# Patient Record
Sex: FEMALE | Race: WHITE | Employment: OTHER | URBAN - NONMETROPOLITAN AREA
[De-identification: names, ages, dates, MRNs, and addresses within clinical notes are randomized per-mention and may not be internally consistent; named-entity substitution may affect disease eponyms.]

---

## 2021-01-26 ENCOUNTER — OUTSIDE SERVICES (OUTPATIENT)
Dept: FAMILY MEDICINE CLINIC | Age: 76
End: 2021-01-26
Payer: MEDICARE

## 2021-01-26 DIAGNOSIS — I82.509 CHRONIC DEEP VEIN THROMBOSIS (DVT) OF LOWER EXTREMITY, UNSPECIFIED LATERALITY, UNSPECIFIED VEIN (HCC): ICD-10-CM

## 2021-01-26 DIAGNOSIS — N39.46 MIXED STRESS AND URGE URINARY INCONTINENCE: ICD-10-CM

## 2021-01-26 DIAGNOSIS — N32.81 OAB (OVERACTIVE BLADDER): ICD-10-CM

## 2021-01-26 DIAGNOSIS — Z98.890 H/O LUMBAR DISCECTOMY: ICD-10-CM

## 2021-01-26 DIAGNOSIS — E05.90 HYPERTHYROIDISM: ICD-10-CM

## 2021-01-26 DIAGNOSIS — I87.2 CHRONIC VENOUS INSUFFICIENCY: ICD-10-CM

## 2021-01-26 DIAGNOSIS — F03.90 DEMENTIA WITHOUT BEHAVIORAL DISTURBANCE, UNSPECIFIED DEMENTIA TYPE: ICD-10-CM

## 2021-01-26 DIAGNOSIS — M54.50 LUMBAR PAIN: ICD-10-CM

## 2021-01-26 DIAGNOSIS — I89.0 LYMPHEDEMA OF BOTH LOWER EXTREMITIES: Primary | ICD-10-CM

## 2021-01-26 DIAGNOSIS — G57.93 NEUROPATHY INVOLVING BOTH LOWER EXTREMITIES: ICD-10-CM

## 2021-01-26 DIAGNOSIS — Z98.890 HISTORY OF HEMILAMINECTOMY: ICD-10-CM

## 2021-01-26 DIAGNOSIS — D64.9 NORMOCHROMIC NORMOCYTIC ANEMIA: ICD-10-CM

## 2021-01-26 DIAGNOSIS — M81.0 AGE-RELATED OSTEOPOROSIS WITHOUT CURRENT PATHOLOGICAL FRACTURE: ICD-10-CM

## 2021-01-26 NOTE — PROGRESS NOTES
1/26/2021    Hank Brink  1945    This resident is being seen today for 2 months evaluation visit. She is a resident who has long-term medical conditions including lymphedema to lower legs, chronic back pain with degenerative disc disease and spinal stenosis with neurogenic claudication neuropathy pain, status post hemilaminectomy partial fasciotomy and foraminotomy of L3-L5 with discectomy, chronic venous insufficiency, DVT right lower extremity, chronic osteoarthritis and osteoporosis, hyper thyroidism, dementia, overactive bladder, urinary incontinence, normochromic normocytic anemia and debilitation. Patient reports that she stopped taking most of her medications including her Xarelto for the DVTs patient tends to be noncompliant with her medications as well as her diet. .  She is a 76 y.o. female resident who is being seen today for chronic conditions. .  Currently at this time she denies any complaints of chest pain or palpitations. Denies any headaches, any sore throat, coughing, or shortness of breath. No nausea, vomiting, constipation or diarrhea. No pain in lower extremities. No fever, or chills. No recent falls or syncopal events.     Objective     Vital Signs: Blood pressure 128/67 pulse 85 respirations 22 temp 97.3 saturation 91% and weight is 236.4 pounds Physical examination:Skin is essentially warm and dry. HEENT unremarkable. Neck is supple. Heart regular rate and rhythm. No rubs, gallops or murmurs noted. Lungs are clear to auscultation. No evidence of rhonchi, rales, or wheezing. Abdomen is soft, supple and non-tender. Bowel sounds are noted x4 quadrants. No rigidity, guarding or rebound tenderness. Negative Ruiz's, negative McBurney's, negative Dion's. Extremities; no true pitting edema. Pulses are adequate. No clubbing  or no cyanosis noted. Neurologically she  is alert and oriented x3. No evidence of paralysis or paresthesias noted. Self propels in wheelchair for mobility. Diagnoses and all orders for this visit:    Lymphedema of both lower extremities    Lumbar pain    Neuropathy involving both lower extremities    History of hemilaminectomy    H/O lumbar discectomy    Chronic venous insufficiency    Age-related osteoporosis without current pathological fracture    Chronic deep vein thrombosis (DVT) of lower extremity, unspecified laterality, unspecified vein (HCC)    Hyperthyroidism    Dementia without behavioral disturbance, unspecified dementia type (HCC)    OAB (overactive bladder)    Mixed stress and urge urinary incontinence    Normochromic normocytic anemia    Impaired fasting glucose    Patient's medications and labs are reviewed she will be due for blood work in the morning including CBC CMP lipids TSH and hemoglobin A1c. We will see her back in the office in 3 months further recommendations forthcoming thanks      [address] [place] [address]        *Note was creating using voice recognition software.   The document was reviewed however grammatical errors may exist.

## 2021-02-11 ENCOUNTER — OUTSIDE SERVICES (OUTPATIENT)
Dept: FAMILY MEDICINE CLINIC | Age: 76
End: 2021-02-11
Payer: MEDICARE

## 2021-02-11 DIAGNOSIS — M81.0 AGE-RELATED OSTEOPOROSIS WITHOUT CURRENT PATHOLOGICAL FRACTURE: ICD-10-CM

## 2021-02-11 DIAGNOSIS — E05.90 HYPERTHYROIDISM: ICD-10-CM

## 2021-02-11 DIAGNOSIS — I89.0 LYMPHEDEMA OF BOTH LOWER EXTREMITIES: Primary | ICD-10-CM

## 2021-02-11 DIAGNOSIS — N32.81 OAB (OVERACTIVE BLADDER): ICD-10-CM

## 2021-02-11 DIAGNOSIS — M54.50 LUMBAR PAIN: ICD-10-CM

## 2021-02-11 NOTE — PROGRESS NOTES
2/11/2021    Emi Safe  1945    This resident is being seen today for an acute evaluation visit. She is a resident who has long-term medical conditions including osteoporosis with underlying osteoarthritis, hypothyroidism, memory loss, lumbar compression fracture, chronic anemia, anxiety disorder, urinary incontinence, dementia, depression, spinal stenosis to L3-L4-L4-L5 degenerative in nature, scoliosis with degenerative facet disease with foraminal stenosis of L3-L4-L4-L5 bilaterally, iron deficiency anemia, DVT to the right lower extremity, obesity, GERD, memory impairment associated with presenile dementia, overactive bladder, neuropathy, and unsteady gait. Based on medications, hyper thyroidism. Surgical history includes left knee replacement on 10/30/2014, a revision to the right hip with history of total hip arthroplasty on 2/10/2015, hemilaminectomy with partial facetectomy, bilateral posterior microdecompression, L3-5 microdiscectomy on 11/30/2018. She is a 76 y.o. female resident who is being seen today for lab abnormalities. This is with respect to a low TSH of 0.01. It is of note to mention that this resident has been on methimazole for a period of time but indicates that she has not been taking this for quite a while. She is actually very nonadherent with all of her medications and we did go through them on today's visit. She currently has no associated symptoms with respect to hyperthyroidism in terms of what could be described as nervousness or irritability, sleep issues, heat sensitivity, or muscle weakness. Her only concern was with respect to her urinary frequency with which she did agree to resume her Ditropan to help control these  issues. She furthermore feels that her pain is essentially managed and indicates that she has been taking Neurontin for period of time, although not as directed.   Resident states she takes her Neurontin 1-2 times a day as needed, despite being ordered for 3 times a day routinely. She otherwise denies any current complaints in terms of headaches or dizziness, sore throat, coughing or shortness of breath, chest pain, nausea or vomiting, dysuria, constipation or diarrhea, fever or chills, recent falls or syncopal events. Medications: Donepezil 10 mg daily, Fosamax 70 mcg weekly, Celexa 20 mg daily, gabapentin 600 mg 3 times a day, Ditropan XL 10 mg daily, Zaroxolyn 2.5 mg on Monday Wednesday Friday and Saturday    Objective     Vital Signs: /84 pulse 83 respirations 24 temperature 96.7 oxygen saturation 95% weight 227.4 pounds    Physical examination:Skin is essentially warm and dry. HEENT unremarkable. Neck is supple. Heart regular rate and rhythm. No rubs, gallops or murmurs noted. Lungs are clear to auscultation. No evidence of rhonchi, rales, or wheezing. Abdomen is soft, supple and non-tender. Bowel sounds are noted x4 quadrants. No rigidity, guarding or rebound tenderness. Negative Ruiz's, negative McBurney's, negative Dion's. Extremities; residual edema noted with evidence of underlying lymphedema pulses are adequate. No clubbing  or no cyanosis noted. Neurologically she  is alert and oriented and fairly oriented. No evidence of paralysis or paresthesias noted. Self propels with respect to a wheelchair    Diagnoses and all orders for this visit:    Lymphedema of both lower extremities  Comments:  Resume a Zaroxolyn as previously indicated. Hyperthyroidism  Comments:  Hold methimazole as resident has been nonadherent. Repeat TSH in 6 weeks. Age-related osteoporosis without current pathological fracture  Comments:  Resume Fosamax weekly. Resume multivitamin along with calcium with vitamin D. Lumbar pain  Comments:  Maintain Neurontin. Stop baclofen and fentanyl.    OAB (overactive bladder)  Comments:  Maintain Ditropan. Plan: Plan of care was discussed with the healthcare team with meds and labs reviewed.   Lipid panel within normal limits BUN/creatinine 15/0.48 GFR 96 A1c 5.7 H/H 12.7/35.7 TSH 0.01. I did have a long visit with this resident as we did discuss all of her medications. She does remain nonadherent to almost all of them and indicated when she came in that she had only been taken the Neurontin for a period of time. After a long discussion she is agreeable to resuming the Fosamax every week, resuming the oxybutynin of 10 mg daily, resuming the Celexa of 20 mg daily, resuming the Zaroxolyn of 2.5 mg every Monday Wednesday Friday Saturday, and we will withhold the methimazole at this time and repeat a TSH in the course of 6 weeks. Furthermore, we will stop the fentanyl along with the baclofen, as she states that they are no longer needed. I will however maintain the benefit of the gabapentin 3 times a day, although she indicates she takes this 1-2 times a day as she feels it is needed. I do plan to follow-up with her in the course of the next 2 months, after repeat TSH is obtained. Prescription was given for donepezil on today's evaluation. I will furthermore maintain her plan of care as clinically indicated and I will track her intakes, monitor her weights and behaviors, and see her routinely and as needed with further orders forthcoming. CHUCK OLVERA, APRN - CNP      *Note was creating using voice recognition software.   The document was reviewed however grammatical errors may exist.

## 2021-04-08 ENCOUNTER — OUTSIDE SERVICES (OUTPATIENT)
Dept: FAMILY MEDICINE CLINIC | Age: 76
End: 2021-04-08
Payer: MEDICARE

## 2021-04-08 DIAGNOSIS — M81.0 AGE-RELATED OSTEOPOROSIS WITHOUT CURRENT PATHOLOGICAL FRACTURE: ICD-10-CM

## 2021-04-08 DIAGNOSIS — N32.81 OAB (OVERACTIVE BLADDER): ICD-10-CM

## 2021-04-08 DIAGNOSIS — Z91.199 CURRENT NONADHERENCE TO MEDICAL TREATMENT: Primary | ICD-10-CM

## 2021-04-08 DIAGNOSIS — R60.9 PERIPHERAL EDEMA: ICD-10-CM

## 2021-04-08 DIAGNOSIS — E05.90 HYPERTHYROIDISM: ICD-10-CM

## 2021-04-09 NOTE — PROGRESS NOTES
4/8/2021    Sophie Hall  1945    This resident is being seen today for a follow-up evaluation visit. She is a resident who has long-term medical conditions including osteoporosis with underlying osteoarthritis, hypothyroidism, memory loss, lumbar compression fracture, chronic anemia, anxiety disorder, urinary incontinence, dementia, depression, spinal stenosis to L3-L4-L4-L5 degenerative in nature, scoliosis with degenerative facet disease with foraminal stenosis of L3-L4-L4-L5 bilaterally, iron deficiency anemia, DVT to the right lower extremity, obesity, GERD, memory impairment associated with presenile dementia, overactive bladder, neuropathy, and unsteady gait. Based on medications, hyper thyroidism. Surgical history includes left knee replacement on 10/30/2014, a revision to the right hip with history of total hip arthroplasty on 2/10/2015, hemilaminectomy with partial facetectomy, bilateral posterior microdecompression, L3-5 microdiscectomy on 11/30/2018. She is a 76 y.o. female resident who is being seen today for follow-up evaluation visit with respect to medical management. This is a resident who is nonadherent to medications and did agree upon last visit to take certain medications. She does however bring to my attention that she only takes the gabapentin for pain management and that she only takes it once or twice a day as opposed to 3 times a day. She states that other medications are not taken because she just does not remember. She states that she feels fine and also indicates that she spoke with Dr. Massiel Fritz over the phone and he indicated that all is good and that no medication changes were needed. It is of note to mention that her methimazole has been on hold since 2/11/2021 due to the fact that she has been nonadherent with it for several months. She did have a recent TSH on 3/25/2021 which was low at 0.01.   Resident denies any complaints of headaches or dizziness, chest pain, coughing or shortness of breath, nausea or vomiting, constipation or diarrhea, dysuria or frequency, fever or chills, recent falls or syncopal events. It is of note to mention that she does still continue to complain of urinary urgency/frequency, but again has been noncompliant with her Ditropan. .        Medications:   Donepezil 10 mg daily  Fosamax 70 mcg weekly  Celexa 20 mg daily  gabapentin 600 mg 3 times a day  Ditropan XL 10 mg daily  Zaroxolyn 2.5 mg on M-W-F- Sat  Docusate calcium 240 mg at bedtime  Ferrous sulfate 325 mg daily      Objective     Vital Signs: /85 pulse 104 respirations 24 temperature 96.4 oxygen saturation 93% weight 220.8 pounds    Physical examination:Skin is essentially warm and dry. HEENT unremarkable. Neck is supple. Heart regular rate is rather tachycardic at this time. No rubs, gallops or murmurs noted. Lungs are clear to auscultation. No evidence of rhonchi, rales, or wheezing. Abdomen is soft, supple and non-tender. Bowel sounds are noted x4 quadrants. No rigidity, guarding or rebound tenderness. Negative Ruiz's, negative McBurney's, negative Dion's. Extremities; residual edema noted with evidence of underlying lymphedema pulses are adequate. No clubbing  or no cyanosis noted. Neurologically she  is alert and oriented and fairly oriented. No evidence of paralysis or paresthesias noted. Self propels with respect to a wheelchair    Diagnoses and all orders for this visit:    Current nonadherence to medical treatment  Comments:  Medications with dosages and times recommended written down    Hyperthyroidism  Comments:   Follows with Dr. Patrizia Keating methimazole currently on hold due to nonadherence with a TSH of 0.01    Peripheral edema  Comments:  Zaroxolyn 2.5 mg M-W-Fsat    OAB (overactive bladder)  Comments:  Maintain Ditropan XL 10 mg daily    Age-related osteoporosis without current pathological fracture  Comments:  Maintain Fosamax weekly      Plan: Plan of care was discussed with the healthcare team with meds and labs reviewed. TSH of 0.01, with results faxed to Dr. Kirby Ramos office. Resident does state that she spoke with the endocrinologist and he stated that there were no new orders. I did explain that we would like to get a follow-up TSH in the course of the next 3 months. Furthermore, I did write down all of the medications for this resident in addition to the dosages and how often she should be taking them. She did go over them individually and wrote what they were for. She does indicate that she will attempt to take them more religiously, especially her Ditropan and Zaroxolyn. I did ask that she follow-up with me in the course of the next 3 months and prior to her exam I will recommend a CBC, CMP, iron studies, TSH, T4.  I will furthermore maintain her current medical regimen as stated with her plan of care and I will track her intakes, monitor her weights and behaviors, and see her routinely and as needed with further orders forthcoming. CHUCK OLVERA, APRN - CNP      *Note was creating using voice recognition software.   The document was reviewed however grammatical errors may exist.

## 2021-07-08 ENCOUNTER — OUTSIDE SERVICES (OUTPATIENT)
Dept: FAMILY MEDICINE CLINIC | Age: 76
End: 2021-07-08

## 2021-07-08 DIAGNOSIS — Z91.199 CURRENT NONADHERENCE TO MEDICAL TREATMENT: ICD-10-CM

## 2021-07-08 DIAGNOSIS — M54.50 LUMBAR PAIN: ICD-10-CM

## 2021-07-08 DIAGNOSIS — G57.93 NEUROPATHY INVOLVING BOTH LOWER EXTREMITIES: Primary | ICD-10-CM

## 2021-07-08 DIAGNOSIS — R60.9 PERIPHERAL EDEMA: ICD-10-CM

## 2021-07-08 DIAGNOSIS — E05.90 HYPERTHYROIDISM: ICD-10-CM

## 2021-07-08 NOTE — PROGRESS NOTES
7/8/2021    Luz Maria Demarco  1945    This resident is being seen today for a follow-up evaluation visit. She is a resident who has long-term medical conditions including osteoporosis with underlying osteoarthritis, hypothyroidism, memory loss, lumbar compression fracture, chronic anemia, anxiety disorder, urinary incontinence, dementia, depression, spinal stenosis to L3-L4-L4-L5 degenerative in nature, scoliosis with degenerative facet disease with foraminal stenosis of L3-L4-L4-L5 bilaterally, iron deficiency anemia, DVT to the right lower extremity, obesity, GERD, memory impairment associated with presenile dementia, overactive bladder, neuropathy, and unsteady gait. Based on medications, hyper thyroidism. Surgical history includes left knee replacement on 10/30/2014, a revision to the right hip with history of total hip arthroplasty on 2/10/2015, hemilaminectomy with partial facetectomy, bilateral posterior microdecompression, L3-5 microdiscectomy on 11/30/2018. She is a 68 y.o. female resident who is being seen today for a 3-month follow-up evaluation. She indicates that she has constant pain to her legs and feet and does furthermore utilize her gabapentin, although not on a routine basis. This is a resident who has a history of medication noncompliance and has recommendations to take Neurontin 3 times daily routinely. Resident indicates that she takes it 1-2 times a day and maybe 3 depending on the activity level. She furthermore has been under assessment for some degree of edema to the bilateral lower extremities and admits to noncompliance with respect to her diuretic management. She otherwise states that she has had no headaches or dizziness, sore throat, chest pain, coughing or shortness of breath, nausea or vomiting, constipation or diarrhea, dysuria or frequency, fever or chills, falls or syncopal events.             Medications:   Donepezil 10 mg daily  Fosamax 70 mcg weekly  Celexa 20 mg daily  gabapentin 600 mg 3 times a day  Ditropan XL 10 mg daily  Zaroxolyn 2.5 mg on M-W-F- Sat  Docusate calcium 240 mg at bedtime  Ferrous sulfate 325 mg daily      Objective     Vital Signs: /77 pulse 100       respirations 24 temperature 97.1 oxygen saturation 93% weight 214.5 pounds    Physical examination:Skin is essentially warm and dry. HEENT unremarkable. Neck is supple. Heart regular rate is rather tachycardic at this time. No rubs, gallops or murmurs noted. Lungs are clear to auscultation. No evidence of rhonchi, rales, or wheezing. Abdomen is soft, supple and non-tender. Bowel sounds are noted x4 quadrants. No rigidity, guarding or rebound tenderness. Negative Ruiz's, negative McBurney's, negative Dion's. Extremities; residual edema noted with evidence of underlying lymphedema pulses are adequate. No clubbing  or no cyanosis noted. Neurologically she  is alert and oriented and fairly oriented. No evidence of paralysis or paresthesias noted. Self propels with respect to a wheelchair    Diagnoses and all orders for this visit:    Neuropathy involving both lower extremities  Comments:  Maintain the benefit of Neurontin with recommendations to take on a more routine basis    Current nonadherence to medical treatment  Comments:  Medication list copied and explained to resident    Hyperthyroidism  Comments:  Resident to withhold methimazole until follow-up with endocrinology    Lumbar pain  Comments:  Maintain gabapentin    Peripheral edema  Comments:  Maintain Zaroxolyn with resident encouraged to utilize medication      Plan: Plan of care was discussed with the healthcare team with meds and labs reviewed. Resident had labs completed 7/1/2021 with a total iron of 44 iron-binding capacity of 197 ferritin of 152 BUN/creatinine 14/0.47 GFR of 96 TSH 0.01 H/H 13.2/38. 6.   I did go over the medications with this resident at length and made her a copy of the medication list.  I then proceeded to write down why she was taking each of her medications. I did explain to her that she can continue to withhold the methimazole, as had previously been recommended by her endocrinologist.  Resident admits that she has been nonadherent to her medical regimen and that normally only takes her gabapentin. I did explain to her that the Zaroxolyn is beneficial to maintain management of her edema. I did ask the resident to bring her bottles of medications to her next visit. I will continue with the current recommendations as indicated and see her routinely and as needed with further orders forthcoming. CHUCK OLVERA, APRN - CNP      *Note was creating using voice recognition software.   The document was reviewed however grammatical errors may exist.

## 2021-07-29 ENCOUNTER — OUTSIDE SERVICES (OUTPATIENT)
Dept: FAMILY MEDICINE CLINIC | Age: 76
End: 2021-07-29

## 2021-07-29 DIAGNOSIS — R26.81 GAIT INSTABILITY: ICD-10-CM

## 2021-07-29 DIAGNOSIS — N32.81 OAB (OVERACTIVE BLADDER): ICD-10-CM

## 2021-07-29 DIAGNOSIS — I87.2 CHRONIC VENOUS INSUFFICIENCY: ICD-10-CM

## 2021-07-29 DIAGNOSIS — M54.50 LUMBAR PAIN: Primary | ICD-10-CM

## 2021-07-29 DIAGNOSIS — G57.93 NEUROPATHY INVOLVING BOTH LOWER EXTREMITIES: ICD-10-CM

## 2021-07-30 NOTE — PROGRESS NOTES
7/29/2021    Sneha Reza  1945    This resident is being seen today for an acute evaluation visit. She is a resident who has long-term medical conditions including osteoporosis with underlying osteoarthritis, hypothyroidism, memory loss, lumbar compression fracture, chronic anemia, anxiety disorder, urinary incontinence, dementia, depression, spinal stenosis to L3-L4-L4-L5 degenerative in nature, scoliosis with degenerative facet disease with foraminal stenosis of L3-L4-L4-L5 bilaterally, iron deficiency anemia, DVT to the right lower extremity, obesity, GERD, memory impairment associated with presenile dementia, overactive bladder, neuropathy, and unsteady gait. Based on medications, hyper thyroidism. Surgical history includes left knee replacement on 10/30/2014, a revision to the right hip with history of total hip arthroplasty on 2/10/2015, hemilaminectomy with partial facetectomy, bilateral posterior microdecompression, L3-5 microdiscectomy on 11/30/2018. She is a 68 y.o. female resident who is being seen today for an acute evaluation visit per the resident request with respect to a request for a motorized scooter. Resident indicates that she is having low back pain and that it has been exacerbated over the course of the past 2 weeks. This is a resident who was able to self transfer but states that she can essentially not walk. She currently utilizes a wheelchair and is able to self propel. I did explain to her that this could be more detrimental due to the fact that she will not use her lower extremities and her upper extremities as much if she is in a motorized scooter. I feel that she will progressively worsened in terms of a weakening status. She was very adamant that she would like to be seen in conjunction with therapy for assessment for a scooter.   She furthermore denies any numbness or tingling, headaches or dizziness, sore throat, chest pain, coughing or shortness of breath, nausea or vomiting, constipation or diarrhea, dysuria or frequency, fever or chills, falls or syncopal events. Medications:   Donepezil 10 mg daily  Fosamax 70 mcg weekly  Celexa 20 mg daily  gabapentin 600 mg 3 times a day  Ditropan XL 10 mg daily  Zaroxolyn 2.5 mg on M-W-F- Sat  Docusate calcium 240 mg at bedtime  Ferrous sulfate 325 mg daily      Objective     Vital Signs: /74 pulse 95       respirations 18 temperature 97.0 oxygen saturation 97% weight 214.5 pounds    Physical examination:Skin is essentially warm and dry. HEENT unremarkable. Neck is supple. Heart regular rate is rather tachycardic at this time. No rubs, gallops or murmurs noted. Lungs are clear to auscultation. No evidence of rhonchi, rales, or wheezing. Abdomen is soft, supple and non-tender. Bowel sounds are noted x4 quadrants. No rigidity, guarding or rebound tenderness. Negative Ruiz's, negative McBurney's, negative Dion's. Extremities; residual edema noted with evidence of underlying lymphedema pulses are adequate. No clubbing  or no cyanosis noted. Neurologically she  is alert and oriented and fairly oriented. No evidence of paralysis or paresthesias noted. Self propels with respect to a wheelchair    Diagnoses and all orders for this visit:    Lumbar pain  Comments:  Maintain gabapentin and refer to occupational therapy for assessment for an electric scooter    Gait instability  Comments:  Refer to Occupational Therapy for assessment for an electric scooter    Chronic venous insufficiency  Comments:  Stable with the benefit of diuretic management with recommendations to offload    Neuropathy involving both lower extremities  Comments:  Controlled with gabapentin    OAB (overactive bladder)  Comments:  Stable with Ditropan      Plan: Plan of care was discussed with the healthcare team with meds and labs reviewed.   Resident did have recent labs completed 7/1/2021 with a total iron of 44 TIBC 197 BUN/creatinine 14/0.47 GFR 96 TSH 0.01 H/H 13.2/38. 6. As per the request of the resident, I will refer to occupational therapy for assessment for an electric scooter. Again, I did attempt to speak with her regarding the fact that I feel that she will become progressively worse in terms of weakening status, but she is insistent upon having a scooter. I will otherwise maintain her plan of care as stated and see her routinely and as needed with further orders forthcoming. CHUCK OLVERA, APRN - CNP      *Note was creating using voice recognition software.   The document was reviewed however grammatical errors may exist.

## 2021-09-22 ENCOUNTER — OUTSIDE SERVICES (OUTPATIENT)
Dept: FAMILY MEDICINE CLINIC | Age: 76
End: 2021-09-22
Payer: MEDICARE

## 2021-09-22 DIAGNOSIS — R60.9 PERIPHERAL EDEMA: Primary | ICD-10-CM

## 2021-09-22 DIAGNOSIS — R26.81 GAIT INSTABILITY: ICD-10-CM

## 2021-09-22 DIAGNOSIS — Z91.199 CURRENT NONADHERENCE TO MEDICAL TREATMENT: ICD-10-CM

## 2021-09-22 DIAGNOSIS — I89.0 LYMPHEDEMA OF BOTH LOWER EXTREMITIES: ICD-10-CM

## 2021-09-22 DIAGNOSIS — G57.93 NEUROPATHY INVOLVING BOTH LOWER EXTREMITIES: ICD-10-CM

## 2021-09-22 NOTE — PROGRESS NOTES
9/22/2021    Pat Stein  1945    This resident is being seen today for an acute evaluation visit. She is a resident who has long-term medical conditions including osteoporosis with underlying osteoarthritis, hypothyroidism, memory loss, lumbar compression fracture, chronic anemia, anxiety disorder, urinary incontinence, dementia, depression, spinal stenosis to L3-L4-L4-L5 degenerative in nature, scoliosis with degenerative facet disease with foraminal stenosis of L3-L4-L4-L5 bilaterally, iron deficiency anemia, DVT to the right lower extremity, obesity, GERD, memory impairment associated with presenile dementia, overactive bladder, neuropathy, and unsteady gait. Based on medications, hyper thyroidism. Surgical history includes left knee replacement on 10/30/2014, a revision to the right hip with history of total hip arthroplasty on 2/10/2015, hemilaminectomy with partial facetectomy, bilateral posterior microdecompression, L3-5 microdiscectomy on 11/30/2018. She is a 68 y.o. female resident who is being seen today for an acute evaluation visit secondary to what is described as discomfort to the bilateral feet. Resident feels that she has had some increased edema which is exacerbating her neuropathy and causing discomfort. She does however admit that she is noncompliant with all of her medications. We have had discussions about this on numerous occasions, but resident indicates that she is not sure as to what she should take or why she is taking it. In this regard, I did write down all of her medications with the dose and the reason why she is taking them. I did explain to her that I feel that a lot of her symptomatology would be resolved if she would just take her medications correctly.   She furthermore denies any other complaints at this time in terms of headaches or dizziness,  sore throat, chest pain, coughing or shortness of breath, nausea or vomiting, constipation or diarrhea, dysuria or frequency, fever or chills, falls or syncopal events. Medications:   Donepezil 10 mg daily  Fosamax 70 mcg weekly  Celexa 20 mg daily  gabapentin 600 mg 3 times a day  Ditropan XL 10 mg daily  Zaroxolyn 2.5 mg on M-W-F  Docusate calcium 240 mg at bedtime  Ferrous sulfate 325 mg daily      Objective     Vital Signs: /79 pulse 72       respirations 22 temperature 96.5 oxygen saturation 94% weight 215.6 pounds    Physical examination:Skin is essentially warm and dry. HEENT unremarkable. Neck is supple. Heart regular rate is rather tachycardic at this time. No rubs, gallops or murmurs noted. Lungs are clear to auscultation. No evidence of rhonchi, rales, or wheezing. Abdomen is soft, supple and non-tender. Bowel sounds are noted x4 quadrants. No rigidity, guarding or rebound tenderness. Negative Ruiz's, negative McBurney's, negative Dion's. Extremities; residual edema noted with evidence of underlying lymphedema pulses are adequate. No clubbing  or no cyanosis noted. Neurologically she  is alert and oriented and fairly oriented. No evidence of paralysis or paresthesias noted. Self propels with respect to a wheelchair    Diagnoses and all orders for this visit:    Peripheral edema  Comments:  Initiate Lasix 20 mg daily for 14 days with potassium supplementation and maintain Zaroxolyn every Monday, Wednesday and Friday    Neuropathy involving both lower extremities  Comments:  Maintain the benefit of Neurontin and control edema to help improve pain    Lymphedema of both lower extremities  Comments:  Maintain diuretic management as indicated    Current nonadherence to medical treatment  Comments:  Medications written out with explanation of use and dosage to be taken    Gait instability  Comments:  Given the benefit of a motorized scooter      Plan: Plan of care was discussed with the healthcare team with meds and labs reviewed.   I do have concerns about her bilateral lower extremities and what appears to be some increasing edema. I am therefore going to place her on Lasix 20 mg daily for 14 days with potassium supplementation and advised that she take the Zaroxolyn as indicated on Monday, Wednesday and Friday. I will furthermore ask that she follow-up with me in the course of 1 week to furthermore reevaluate. Again, I did encourage her to take all of her medications as directed. She has had a history of noncompliance, but some of which are relate to memory issues. I did explain to her that she is on Aricept to help in this regard. She states that she has a friend that is going to try to keep her on track as well and that she has made a promise to take her medications. I will therefore maintain her plan of care as directed and continue to see her routinely and as needed with further orders forthcoming. CHUCK OLVERA, APRN - CNP      *Note was creating using voice recognition software.   The document was reviewed however grammatical errors may exist.

## 2021-10-07 ENCOUNTER — OUTSIDE SERVICES (OUTPATIENT)
Dept: FAMILY MEDICINE CLINIC | Age: 76
End: 2021-10-07
Payer: MEDICARE

## 2021-10-07 DIAGNOSIS — R60.9 PERIPHERAL EDEMA: ICD-10-CM

## 2021-10-07 DIAGNOSIS — T50.905A WEIGHT LOSS DUE TO MEDICATION: Primary | ICD-10-CM

## 2021-10-07 DIAGNOSIS — R26.81 GAIT INSTABILITY: ICD-10-CM

## 2021-10-07 DIAGNOSIS — R63.4 WEIGHT LOSS DUE TO MEDICATION: Primary | ICD-10-CM

## 2021-10-07 DIAGNOSIS — M81.0 AGE-RELATED OSTEOPOROSIS WITHOUT CURRENT PATHOLOGICAL FRACTURE: ICD-10-CM

## 2021-10-07 DIAGNOSIS — N32.81 OAB (OVERACTIVE BLADDER): ICD-10-CM

## 2021-10-08 NOTE — PROGRESS NOTES
10/7/2021    Bette Koo  1945    This resident is being seen today for a follow-up evaluation visit. She is a resident who has long-term medical conditions including osteoporosis with underlying osteoarthritis, hypothyroidism, memory loss, lumbar compression fracture, chronic anemia, anxiety disorder, urinary incontinence, dementia, depression, spinal stenosis to L3-L4-L4-L5 degenerative in nature, scoliosis with degenerative facet disease with foraminal stenosis of L3-L4-L4-L5 bilaterally, iron deficiency anemia, DVT to the right lower extremity, obesity, GERD, memory impairment associated with presenile dementia, overactive bladder, neuropathy, and unsteady gait. Based on medications, hyper thyroidism. Surgical history includes left knee replacement on 10/30/2014, a revision to the right hip with history of total hip arthroplasty on 2/10/2015, hemilaminectomy with partial facetectomy, bilateral posterior microdecompression, L3-5 microdiscectomy on 11/30/2018. She is a 68 y.o. female resident who is being seen today for a follow-up evaluation visit regarding recent assessment for edema with which she was placed on diuretic management. Resident has had fairly significant weight loss, which she contributes to the fact that she has been cutting back on foods. However, she has had a significant amount of improvement with respect to the edema to her bilateral lower extremities. This is a resident who is essentially alert and oriented and does utilize a motorized scooter. She denies any current complaints of pain, headaches or dizziness, sore throat, chest pain, coughing or shortness of breath, nausea or vomiting, constipation or diarrhea, dysuria or frequency, fever or chills, falls or syncopal events.         Medications:   Donepezil 10 mg daily  Fosamax 70 mcg weekly  Celexa 20 mg daily  gabapentin 600 mg 3 times a day  Ditropan XL 10 mg daily  Zaroxolyn 2.5 mg on M-W-F  Docusate calcium 240 mg at bedtime  Ferrous sulfate 325 mg daily  Lasix 20 mg daily  KCl 10 mEq daily        Objective     Vital Signs: BP 98/73 pulse 86       respirations 18 temperature 96.9 oxygen saturation 96% weight 201 pounds    Physical examination:Skin is essentially warm and dry. HEENT unremarkable. Neck is supple. Heart regular rate is rather tachycardic at this time. No rubs, gallops or murmurs noted. Lungs are clear to auscultation. No evidence of rhonchi, rales, or wheezing. Abdomen is soft, supple and non-tender. Bowel sounds are noted x4 quadrants. No rigidity, guarding or rebound tenderness. Negative Ruiz's, negative McBurney's, negative Dion's. Extremities; no edema noted with evidence of underlying lymphedema pulses are adequate. No clubbing  or no cyanosis noted. Neurologically she  is alert and oriented and fairly oriented. No evidence of paralysis or paresthesias noted. Self propels with respect to a wheelchair    Diagnoses and all orders for this visit:    Weight loss due to medication  Comments:  Secondary to diuretic management    Peripheral edema  Comments:  Maintain Lasix with potassium supplementation and evaluate BMP in 1 week    OAB (overactive bladder)  Comments:  Currently relatively stable with Ditropan    Age-related osteoporosis without current pathological fracture  Comments:  Maintain Fosamax    Gait instability  Comments:  Utilizes scooter      Plan: Plan of care was discussed with the healthcare team with meds and labs reviewed. Resident has had significant improvement with respect to her edema and has actually had a fairly significant weight loss. Am therefore going to maintain the benefit of her Lasix supplementation 20 mg daily with potassium supplementation. I did ask that she have labs completed in the course of a week with respect to a BMP to monitor her kidney function and potassium level.   I do furthermore think that she has been doing relatively well and is stable enough to follow-up in the course of the next 3 months. I will otherwise maintain her plan of care as indicated and I will see her routinely and as needed with further orders forthcoming. CHUCK OLVERA, APRN - CNP      *Note was creating using voice recognition software.   The document was reviewed however grammatical errors may exist.

## 2021-12-02 ENCOUNTER — OUTSIDE SERVICES (OUTPATIENT)
Dept: FAMILY MEDICINE CLINIC | Age: 76
End: 2021-12-02
Payer: MEDICARE

## 2021-12-02 DIAGNOSIS — E05.90 HYPERTHYROIDISM: ICD-10-CM

## 2021-12-02 DIAGNOSIS — Z91.199 CURRENT NONADHERENCE TO MEDICAL TREATMENT: ICD-10-CM

## 2021-12-02 DIAGNOSIS — R60.9 PERIPHERAL EDEMA: Primary | ICD-10-CM

## 2021-12-02 DIAGNOSIS — G57.93 NEUROPATHY INVOLVING BOTH LOWER EXTREMITIES: ICD-10-CM

## 2021-12-02 DIAGNOSIS — I87.2 CHRONIC VENOUS INSUFFICIENCY: ICD-10-CM

## 2021-12-02 NOTE — PROGRESS NOTES
12/2/2021    Ami Sheppard  1945    This resident is being seen today for a follow-up evaluation visit. She is a resident who has long-term medical conditions including osteoporosis with underlying osteoarthritis, hypothyroidism, memory loss, lumbar compression fracture, chronic anemia, anxiety disorder, urinary incontinence, dementia, depression, spinal stenosis to L3-L4-L4-L5 degenerative in nature, scoliosis with degenerative facet disease with foraminal stenosis of L3-L4-L4-L5 bilaterally, iron deficiency anemia, DVT to the right lower extremity, obesity, GERD, memory impairment associated with presenile dementia, overactive bladder, neuropathy, and unsteady gait. Based on medications, hyper thyroidism. Surgical history includes left knee replacement on 10/30/2014, a revision to the right hip with history of total hip arthroplasty on 2/10/2015, hemilaminectomy with partial facetectomy, bilateral posterior microdecompression, L3-5 microdiscectomy on 11/30/2018. She is a 68 y.o. female resident who is being seen today for a follow-up evaluation regarding ongoing edema to the bilateral lower extremities, with which she does have appropriate diuretic management recommended. Unfortunately, this resident is extremely noncompliant with respect to her medications. She does admit that she is nonadherent and is really unsure as to what medication she has or when she should be taking them. It is of note to mention that we have gone over this on previous visits and I have given her a list and explained all of her medications. She does admit to some bilateral feet pain, most likely because they are swollen. This is a resident who has been utilizing a motorized scooter and is alert and oriented x3.   She denies any headaches or dizziness, sore throat, chest pain, coughing or shortness of breath, nausea or vomiting, constipation or diarrhea, dysuria or frequency, fever or chills, falls or syncopal events. Medications:   Donepezil 10 mg daily  Fosamax 70 mcg weekly  Celexa 20 mg daily  gabapentin 600 mg 3 times a day  Ditropan XL 10 mg daily  Zaroxolyn 2.5 mg on M-W-F  Docusate calcium 240 mg at bedtime  Ferrous sulfate 325 mg daily  Lasix 20 mg daily  KCl 10 mEq daily        Objective     Vital Signs: /69 pulse 82       respirations 18 temperature 96.6 oxygen saturation 95% weight refused on today's evaluation    Physical examination:Skin is essentially warm and dry. HEENT unremarkable. Neck is supple. Heart regular rate is rather tachycardic at this time. No rubs, gallops or murmurs noted. Lungs are clear to auscultation. No evidence of rhonchi, rales, or wheezing. Abdomen is soft, supple and non-tender. Bowel sounds are noted x4 quadrants. No rigidity, guarding or rebound tenderness. Negative Ruiz's, negative McBurney's, negative Dion's. Extremities; she does have residual edema to the bilateral lower extremities, most notably the bilateral pedal area with evidence of underlying lymphedema pulses are adequate. No clubbing  or no cyanosis noted. Neurologically she  is alert and oriented and fairly oriented. No evidence of paralysis or paresthesias noted. Self propels with respect to a wheelchair    Diagnoses and all orders for this visit:    Peripheral edema  Comments:  Maintain Bumex with ACE wraps and offloading    Current nonadherence to medical treatment  Comments:  Recommend clinic staff to complete pillbox    Hyperthyroidism  Comments:  Stable with observation     Chronic venous insufficiency  Comments:  Maintain diuretic management and offloading    Neuropathy involving both lower extremities  Comments:  Stable with Gabapentin      Plan: Plan of care was discussed with the healthcare team with meds and labs reviewed.   As I explained to this resident, I do not feel that I am going to be able to obtain the results 9 looking for with respect to this resident without her taking her medications properly. She does admit and agree to this. I did discuss with her the benefit of a pillbox and having her pillbox prepared by the health clinic and picking it up on a weekly basis. She does state that she thinks that she would be able to do that, although she was not keen on coming to the clinic on a weekly basis. I did however have her discuss this with the staff at the clinic. As I did explain to her, I do feel that we can get better control of her underlying issues if she were taking her medications properly. I will otherwise continue with her plan of care with recommendations to see her in 3 months with labs prior to her next visit including a CBC, CMP, iron studies, and a vitamin D.    CHUCK OLVERA, APRN - CNP      *Note was creating using voice recognition software.   The document was reviewed however grammatical errors may exist.

## 2022-03-31 ENCOUNTER — OUTSIDE SERVICES (OUTPATIENT)
Dept: FAMILY MEDICINE CLINIC | Age: 77
End: 2022-03-31
Payer: MEDICARE

## 2022-03-31 DIAGNOSIS — N32.81 OAB (OVERACTIVE BLADDER): ICD-10-CM

## 2022-03-31 DIAGNOSIS — R26.81 GAIT INSTABILITY: ICD-10-CM

## 2022-03-31 DIAGNOSIS — I87.2 CHRONIC VENOUS INSUFFICIENCY: ICD-10-CM

## 2022-03-31 DIAGNOSIS — M54.50 LUMBAR PAIN: Primary | ICD-10-CM

## 2022-03-31 DIAGNOSIS — F03.90 DEMENTIA WITHOUT BEHAVIORAL DISTURBANCE, UNSPECIFIED DEMENTIA TYPE: ICD-10-CM

## 2022-03-31 PROCEDURE — 99309 SBSQ NF CARE MODERATE MDM 30: CPT | Performed by: NURSE PRACTITIONER

## 2022-03-31 NOTE — PROGRESS NOTES
3/31/2022    Ruddy Walker  1945    This resident is being seen today for a skilled evaluation visit. She is a resident who has long-term medical conditions including osteoporosis with underlying osteoarthritis, hypothyroidism, memory loss, lumbar compression fracture, chronic anemia, anxiety disorder, urinary incontinence, dementia, depression, spinal stenosis to L3-L4-L4-L5 degenerative in nature, scoliosis with degenerative facet disease with foraminal stenosis of L3-L4-L4-L5 bilaterally, iron deficiency anemia, DVT to the right lower extremity, obesity, GERD, memory impairment associated with presenile dementia, overactive bladder, neuropathy, and unsteady gait. Based on medications, hyper thyroidism. Surgical history includes left knee replacement on 10/30/2014, a revision to the right hip with history of total hip arthroplasty on 2/10/2015, hemilaminectomy with partial facetectomy, bilateral posterior microdecompression, L3-5 microdiscectomy on 2018. She is a 68 y.o. female resident who is being seen today for a skilled evaluation visit with which this resident has had the benefit of physical therapy with occupational therapy services. She was admitted to the facility secondary to alteration mentation with an underlying urinary tract infection. She was complaining of back pain, which staff states that she has a chronic complaint of back pain in the morning hours. She did have imaging studies completed on 3/21/2022 to the lumbar spine with evidence of diffuse degenerative changes. She has been under assessment for some degree of edema of the lower extremities with underlying venous insufficiency and had recent upper titration of her Zaroxolyn. Furthermore, it has been brought to my attention that she has been under assessment for what appears to be some moisture damage to the luisito cleft.   She furthermore does not have any complaints of headaches or dizziness, sore throat, chest pain, coughing or shortness of breath, nausea or vomiting, constipation or diarrhea, dysuria or frequency, fever or chills, falls or syncopal events. She does remain fairly alert and oriented and is bed to chair confined with recommendations to transfer by way of assist x1. Medications:   Acetaminophen 650 mg every 4 hours as needed  Potassium chloride ER 40 mEq daily  Aricept 10 mg at bedtime  Fosamax 70 mcg weekly  gabapentin 600 mg 3 times a day  Docusate calcium 240 mg at bedtime  Ferrous sulfate 325 mg daily  Ditropan XL 10 mg daily  Celexa 20 mg at bedtime  Metolazone 2.5 mg daily        Objective     Vital Signs: /74 pulse 93       respirations 18 temperature 96.5 oxygen saturation 96%         Physical examination:Skin is essentially warm and dry. HEENT unremarkable. Neck is supple. Heart is consistent with ongoing atrial fibrillation with a moderate ventricular response without rubs or gallops noted. Lungs are clear to auscultation. No evidence of rhonchi, rales, or wheezing. Abdomen is soft, supple and non-tender. Bowel sounds are noted x4 quadrants. No rigidity, guarding or rebound tenderness. Negative Ruiz's, negative McBurney's, negative Dion's. Extremities; she does have venous insufficiency with chronic lymphedema to the lower extremities. She does have some arthritic changes to the shoulders and knees. No clubbing  or no cyanosis noted. Neurologically she  is alert and oriented and fairly oriented. No evidence of paralysis or paresthesias noted. Self propels with respect to a wheelchair    Diagnoses and all orders for this visit:    Lumbar pain  Comments:  Imaging studies consistent with diffuse degenerative changes. Initiate Tylenol 650 mg every morning. Chronic venous insufficiency  Comments:  Maintain cautious diuretic management    Gait instability  Comments:  Maintain physical therapy with occupational therapy services.     OAB (overactive bladder)  Comments:  Controlled with Ditropan XL    Dementia without behavioral disturbance, unspecified dementia type ([facility] [address]  Comments:  Stable with low-dose Aricept      Plan: Plan of care was discussed with the healthcare team with meds and labs reviewed. BUN/creatinine 14/0.51 with a GFR of 93 potassium 3.5 previously 3.3 lipids within normal limits H/H 12.2/35. 1. Resident does continuously complain of back pain in the morning hours so I am to recommend Tylenol 650 mg every morning routinely with staff to continue to monitor her with respect to pain management. She will continue with her current medical regimen as otherwise directed along with the benefit of her therapy services and I will continue to track her intakes, monitor her weights and behaviors, and see her routinely and as needed with further orders forthcoming. CHUCK OLVERA, APRN - CNP      *Note was creating using voice recognition software.   The document was reviewed however grammatical errors may exist.